# Patient Record
Sex: MALE | Race: WHITE | NOT HISPANIC OR LATINO | Employment: FULL TIME | URBAN - NONMETROPOLITAN AREA
[De-identification: names, ages, dates, MRNs, and addresses within clinical notes are randomized per-mention and may not be internally consistent; named-entity substitution may affect disease eponyms.]

---

## 2020-02-28 ENCOUNTER — OFFICE VISIT CONVERTED (OUTPATIENT)
Dept: GASTROENTEROLOGY | Facility: CLINIC | Age: 30
End: 2020-02-28
Attending: PHYSICIAN ASSISTANT

## 2021-05-15 VITALS
HEART RATE: 107 BPM | HEIGHT: 71 IN | BODY MASS INDEX: 32.2 KG/M2 | WEIGHT: 230 LBS | OXYGEN SATURATION: 98 % | SYSTOLIC BLOOD PRESSURE: 145 MMHG | RESPIRATION RATE: 16 BRPM | DIASTOLIC BLOOD PRESSURE: 79 MMHG

## 2021-08-11 ENCOUNTER — OFFICE VISIT (OUTPATIENT)
Dept: CARDIOLOGY | Facility: CLINIC | Age: 31
End: 2021-08-11

## 2021-08-11 VITALS
HEART RATE: 92 BPM | SYSTOLIC BLOOD PRESSURE: 141 MMHG | BODY MASS INDEX: 123.26 KG/M2 | DIASTOLIC BLOOD PRESSURE: 77 MMHG | HEIGHT: 60 IN

## 2021-08-11 DIAGNOSIS — I30.9 ACUTE PERICARDITIS, UNSPECIFIED TYPE: Primary | ICD-10-CM

## 2021-08-11 PROCEDURE — 99203 OFFICE O/P NEW LOW 30 MIN: CPT | Performed by: INTERNAL MEDICINE

## 2021-08-11 RX ORDER — PANTOPRAZOLE SODIUM 40 MG/1
TABLET, DELAYED RELEASE ORAL
COMMUNITY
Start: 2021-07-20

## 2021-08-11 RX ORDER — COLCHICINE 0.6 MG/1
0.6 TABLET ORAL DAILY
COMMUNITY

## 2021-08-11 NOTE — PROGRESS NOTES
CARDIOLOGY INITIAL CONSULT       Chief Complaint  Chest Pain (on and off.) and Hospital Follow Up Visit    Subjective            Abilio Valdes presents to [facility] CARDIOLOGY  History of Present Illness    This is a 30-year-old male with history of GERD who is here as a follow-up from recent emergency room visit.  He started having chest pain 2 nights back.  The pain happened while taking deep breaths initially but the next few hours he could not take even a normal blood without hurting.  There was no associated shortness of breath palpitations fever chills runny nose cough or dizziness.  He went to the emergency room the same night EKGs were suggestive of acute pericarditis.  He also had an echocardiogram done and we do not have the reports to review.  He was discharged with a prescription for colchicine and the pain is already better.  He had very minimal pain this morning.  He has no history of any cardiac illness and no previous cardiac work-up available.      Past History:    Medical History: has a past medical history of GERD (gastroesophageal reflux disease).     Surgical History: has no past surgical history on file.     Family History: Reviewed, no family history of premature coronary artery disease.    Social History: reports that he has never smoked. He has quit using smokeless tobacco. He reports current alcohol use. He reports that he does not use drugs.    Allergies: Patient has no known allergies.    Current Outpatient Medications on File Prior to Visit   Medication Sig   • colchicine 0.6 MG tablet Take 0.6 mg by mouth 2 (two) times a day.   • pantoprazole (PROTONIX) 40 MG EC tablet      No current facility-administered medications on file prior to visit.          Review of Systems   Constitutional: Negative for fatigue, unexpected weight gain and unexpected weight loss.   Eyes: Negative for double vision.   Respiratory: Negative for cough, shortness of breath and wheezing.   "  Cardiovascular: Positive for chest pain (Pleuritic). Negative for palpitations and leg swelling.   Gastrointestinal: Negative for abdominal pain, nausea and vomiting.   Endocrine: Negative for cold intolerance, heat intolerance, polydipsia and polyuria.   Musculoskeletal: Negative for arthralgias and back pain.   Skin: Negative for color change.   Neurological: Negative for dizziness, syncope, weakness and headache.   Hematological: Does not bruise/bleed easily.        Objective     /77 (BP Location: Left arm, Patient Position: Sitting)   Pulse 92   Ht 92 cm (36.22\")   .26 kg/m²       Physical Exam  Constitutional:       General: He is awake. He is not in acute distress.     Appearance: Normal appearance.   Eyes:      Extraocular Movements: Extraocular movements intact.      Pupils: Pupils are equal, round, and reactive to light.   Neck:      Thyroid: No thyromegaly.      Vascular: No carotid bruit or JVD.   Cardiovascular:      Rate and Rhythm: Normal rate and regular rhythm.      Chest Wall: PMI is not displaced.      Heart sounds: Normal heart sounds, S1 normal and S2 normal. No murmur heard.   No friction rub. No gallop. No S3 or S4 sounds.    Pulmonary:      Effort: Pulmonary effort is normal. No respiratory distress.      Breath sounds: Normal breath sounds. No wheezing, rhonchi or rales.   Abdominal:      General: Bowel sounds are normal.      Palpations: Abdomen is soft.      Tenderness: There is no abdominal tenderness.   Musculoskeletal:      Cervical back: Neck supple.      Right lower leg: No edema.      Left lower leg: No edema.   Skin:     Nails: There is no clubbing.   Neurological:      General: No focal deficit present.      Mental Status: He is alert and oriented to person, place, and time.           Result Review :     The following data was reviewed by: Jonathan Grant MD on 08/11/2021:            Labs done in [place] ER showed D-dimer is less than 0.27 troponin I of less than " 0.017 BUN 16 creatinine 0.91 sodium 140 potassium 3.8 chloride 103 calcium 9.2 albumin 4.1 AST is 22 ALT is 57 INR is 1.11 hemoglobin 14.7 WBC count is 12.7 platelet count is 217       Data reviewed: Cardiology studies     EKG done at [facility] ER showed normal sinus rhythm normal axis, minimal ST segment depression in multiple leads, suggestive of acute pericarditis although early repolarization pattern cannot be ruled out.             Assessment and Plan        Diagnoses and all orders for this visit:    1. Acute pericarditis, unspecified type (Primary)      Symptoms are highly suggestive of acute pericarditis and EKG shows  minimal ST segment elevation in multiple leads.  He was prescribed colchicine from the ER and symptoms are already better. Recommend to continue colchicine for 2 to 4 weeks.  We will obtain the echocardiogram report from [facility]. NSAIDs can be tried for any breakthrough pain.  The nature of the condition and management plans were discussed in detail with the patient.    I spent 25 minutes caring for Abilio on this date of service. This time includes time spent by me in the following activities:reviewing tests, obtaining and/or reviewing a separately obtained history, performing a medically appropriate examination and/or evaluation , ordering medications, tests, or procedures, and documenting information in the medical record    Follow Up     Return in about 4 weeks (around 9/8/2021) for Recheck,OK to double book .    Patient was given instructions and counseling regarding his condition or for health maintenance advice. Please see specific information pulled into the AVS if appropriate.

## 2021-09-14 ENCOUNTER — OFFICE VISIT (OUTPATIENT)
Dept: CARDIOLOGY | Facility: CLINIC | Age: 31
End: 2021-09-14

## 2021-09-14 VITALS
HEIGHT: 71 IN | SYSTOLIC BLOOD PRESSURE: 133 MMHG | HEART RATE: 88 BPM | DIASTOLIC BLOOD PRESSURE: 80 MMHG | WEIGHT: 228 LBS | BODY MASS INDEX: 31.92 KG/M2

## 2021-09-14 DIAGNOSIS — I30.9 ACUTE PERICARDITIS, UNSPECIFIED TYPE: Primary | ICD-10-CM

## 2021-09-14 PROCEDURE — 99212 OFFICE O/P EST SF 10 MIN: CPT | Performed by: INTERNAL MEDICINE

## 2021-09-14 NOTE — PROGRESS NOTES
CARDIOLOGY FOLLOW-UP PROGRESS NOTE        Chief Complaint  4 weeks f/u follow-up visit for pericarditis    Subjective            Abilio Valdes presents to [facility] CARDIOLOGY  History of Present Illness    This is a 31-year-old male with history of GERD, who was previously seen and evaluated on 8/11/2021 as a follow-up from emergency room visit.  He went to the emergency room because of chest pain.  Symptoms were pleuritic in character.  EKG suggestive of acute pericarditis.  He also had an echocardiogram done in the ER at [facility] which was essentially normal with no evidence of pericardial effusion.  He was discharged on colchicine.  We saw him in the office the next day.  The pain was already better.  No changes were made.  Today patient reports that he is currently taking colchicine only once a day because of severe diarrhea.  The chest pain is completely subsided after 3 days.  He denies having any shortness of breath, palpitations, dizziness.  He is back to full-time work.  He works as a  in high school.       Past History:    Medical History:  Past Medical History:   Diagnosis Date   • Acute pericarditis 9/14/2021   • Hatch esophagus    • GERD (gastroesophageal reflux disease)        Surgical History: has a past surgical history that includes Back surgery; Esophagogastroduodenoscopy (2018); and Neck surgery.     Family History: family history includes Stomach cancer in his maternal grandfather.     Social History: reports that he has never smoked. He has quit using smokeless tobacco. He reports current alcohol use. He reports that he does not use drugs.    Allergies: Patient has no known allergies.    Current Outpatient Medications on File Prior to Visit   Medication Sig   • colchicine 0.6 MG tablet Take 0.6 mg by mouth Daily.   • pantoprazole (PROTONIX) 40 MG EC tablet      No current facility-administered medications on file prior to visit.          Review of Systems  "  Respiratory: Negative for cough, shortness of breath and wheezing.    Cardiovascular: Negative for chest pain, palpitations and leg swelling.   Gastrointestinal: Negative for nausea and vomiting.   Neurological: Negative for dizziness and syncope.        Objective     /80 (BP Location: Left arm, Patient Position: Sitting)   Pulse 88   Ht 180.3 cm (71\")   Wt 103 kg (228 lb)   BMI 31.80 kg/m²       Physical Exam    General : Alert, awake, no acute distress  Neck : Supple, no carotid bruit, no jugular venous distention  CVS : Regular rate and rhythm, no murmur, rubs or gallops  Lungs: Clear to auscultation bilaterally, no crackles or rhonchi  Abdomen: Soft, nontender, bowel sounds heard in all 4 quadrants  Extremities: Warm, well-perfused, no pedal edema    Result Review :     The following data was reviewed by: Jonathan Grant MD on 09/14/2021:                   Data reviewed: Cardiology studies     Echocardiogram done at [facility] on 8/10/2021 showed normal LV systolic function, ejection fraction 55 to 60%, normal valvular structure and function, no evidence of pericardial effusion.             Assessment and Plan        Diagnoses and all orders for this visit:    1. Acute pericarditis, unspecified type (Primary)    Symptoms are completely subsided.  He cannot tolerate colchicine because of diarrhea.  Recent echo showed no evidence of recurrent effusion and preserved LV function.  He can take colchicine every other day for another 1 week and then stop it.  No further evaluation or testing is needed.  We will see him as needed.  He is advised to call our office for any recurrent symptoms or if the chest pain comes back.  Otherwise follow-up with primary care provider.      Follow Up     Return if symptoms worsen or fail to improve.    Patient was given instructions and counseling regarding his condition or for health maintenance advice. Please see specific information pulled into the AVS if " appropriate.